# Patient Record
Sex: FEMALE | Race: BLACK OR AFRICAN AMERICAN | NOT HISPANIC OR LATINO | Employment: OTHER | URBAN - METROPOLITAN AREA
[De-identification: names, ages, dates, MRNs, and addresses within clinical notes are randomized per-mention and may not be internally consistent; named-entity substitution may affect disease eponyms.]

---

## 2019-12-05 PROBLEM — H02.886 MEIBOMIAN GLAND DYSFUNCTION (MGD) OF BOTH EYES: Status: ACTIVE | Noted: 2019-12-05

## 2019-12-05 PROBLEM — H25.813 COMBINED FORM OF AGE-RELATED CATARACT, BOTH EYES: Status: ACTIVE | Noted: 2019-12-05

## 2019-12-05 PROBLEM — H34.8311 BRANCH RETINAL VEIN OCCLUSION OF RIGHT EYE WITH RETINAL NEOVASCULARIZATION: Status: ACTIVE | Noted: 2019-12-05

## 2019-12-05 PROBLEM — H02.883 MEIBOMIAN GLAND DYSFUNCTION (MGD) OF BOTH EYES: Status: ACTIVE | Noted: 2019-12-05

## 2019-12-05 PROBLEM — H47.323 DRUSEN OF BOTH OPTIC DISCS: Status: ACTIVE | Noted: 2019-12-05

## 2019-12-05 PROBLEM — I10 ESSENTIAL HYPERTENSION: Status: ACTIVE | Noted: 2019-12-05

## 2020-03-18 PROBLEM — E78.2 MIXED HYPERLIPIDEMIA: Status: ACTIVE | Noted: 2020-03-18

## 2020-03-18 PROBLEM — E11.9 TYPE 2 DIABETES MELLITUS WITHOUT COMPLICATION, WITHOUT LONG-TERM CURRENT USE OF INSULIN: Status: ACTIVE | Noted: 2020-03-18

## 2020-09-08 PROBLEM — J45.40 MODERATE PERSISTENT ASTHMA WITHOUT COMPLICATION: Status: ACTIVE | Noted: 2018-04-11

## 2020-09-08 PROBLEM — J40 BRONCHITIS: Status: ACTIVE | Noted: 2017-02-13

## 2020-09-08 PROBLEM — J02.9 SORE THROAT: Status: ACTIVE | Noted: 2019-05-13

## 2020-09-08 PROBLEM — B02.29 POSTHERPETIC NEURALGIA: Status: ACTIVE | Noted: 2019-02-28

## 2020-09-08 PROBLEM — K21.9 GASTROESOPHAGEAL REFLUX DISEASE WITHOUT ESOPHAGITIS: Status: ACTIVE | Noted: 2018-04-11

## 2020-09-08 PROBLEM — M17.0 OSTEOARTHRITIS OF BOTH KNEES: Status: ACTIVE | Noted: 2018-04-25

## 2020-09-08 PROBLEM — B00.9 HERPES SIMPLEX TYPE 1 INFECTION: Status: ACTIVE | Noted: 2019-05-13

## 2020-09-08 PROBLEM — G47.33 OSA (OBSTRUCTIVE SLEEP APNEA): Status: ACTIVE | Noted: 2018-06-16

## 2020-09-08 PROBLEM — E11.9 DIABETES MELLITUS: Status: ACTIVE | Noted: 2018-04-25

## 2021-03-26 ENCOUNTER — SPECIALTY PHARMACY (OUTPATIENT)
Dept: PHARMACY | Facility: CLINIC | Age: 66
End: 2021-03-26

## 2021-03-26 PROBLEM — J31.0 NON-ALLERGIC RHINITIS: Status: ACTIVE | Noted: 2021-03-26

## 2021-03-26 PROBLEM — J44.9 CHRONIC OBSTRUCTIVE PULMONARY DISEASE: Status: ACTIVE | Noted: 2021-03-26

## 2021-03-26 PROBLEM — J45.909 EXTRINSIC ASTHMA: Status: ACTIVE | Noted: 2021-03-26

## 2021-06-18 ENCOUNTER — SPECIALTY PHARMACY (OUTPATIENT)
Dept: PHARMACY | Facility: CLINIC | Age: 66
End: 2021-06-18

## 2021-07-05 PROBLEM — J45.909 EXTRINSIC ASTHMA: Status: RESOLVED | Noted: 2021-03-26 | Resolved: 2021-07-05

## 2021-07-05 PROBLEM — J45.50 SEVERE PERSISTENT ASTHMA WITHOUT COMPLICATION: Status: ACTIVE | Noted: 2018-04-11

## 2021-07-16 ENCOUNTER — SPECIALTY PHARMACY (OUTPATIENT)
Dept: PHARMACY | Facility: CLINIC | Age: 66
End: 2021-07-16

## 2021-07-23 ENCOUNTER — SPECIALTY PHARMACY (OUTPATIENT)
Dept: PHARMACY | Facility: CLINIC | Age: 66
End: 2021-07-23

## 2021-08-11 ENCOUNTER — SPECIALTY PHARMACY (OUTPATIENT)
Dept: PHARMACY | Facility: CLINIC | Age: 66
End: 2021-08-11

## 2021-10-15 ENCOUNTER — SPECIALTY PHARMACY (OUTPATIENT)
Dept: PHARMACY | Facility: CLINIC | Age: 66
End: 2021-10-15

## 2021-12-07 ENCOUNTER — SPECIALTY PHARMACY (OUTPATIENT)
Dept: PHARMACY | Facility: CLINIC | Age: 66
End: 2021-12-07

## 2022-02-08 ENCOUNTER — SPECIALTY PHARMACY (OUTPATIENT)
Dept: PHARMACY | Facility: CLINIC | Age: 67
End: 2022-02-08

## 2022-02-11 NOTE — TELEPHONE ENCOUNTER
Call for Fasenra refill. No answer, LVM. Will message provider and pend for 1 week.    Aftab Solares, PharmD  Clinical Pharmacist  [facility]  P: [phone]

## 2022-02-18 NOTE — TELEPHONE ENCOUNTER
No response from patient. Closing [facility] enrollment.     Aftab Solares, PharmD  Clinical Pharmacist  [facility]  P: [phone]